# Patient Record
Sex: MALE | Race: BLACK OR AFRICAN AMERICAN | Employment: UNEMPLOYED | URBAN - NONMETROPOLITAN AREA
[De-identification: names, ages, dates, MRNs, and addresses within clinical notes are randomized per-mention and may not be internally consistent; named-entity substitution may affect disease eponyms.]

---

## 2020-11-26 ENCOUNTER — HOSPITAL ENCOUNTER (EMERGENCY)
Age: 60
Discharge: LEFT BEFORE DISCHARGE | End: 2020-11-26
Attending: FAMILY MEDICINE
Payer: COMMERCIAL

## 2020-11-26 DIAGNOSIS — F10.920 ALCOHOLIC INTOXICATION WITHOUT COMPLICATION (HCC): Primary | ICD-10-CM

## 2020-11-26 PROCEDURE — 99282 EMERGENCY DEPT VISIT SF MDM: CPT

## 2020-11-26 NOTE — ED PROVIDER NOTES
EMERGENCY DEPARTMENT HISTORY AND PHYSICAL EXAM      Date: 11/26/2020  Patient Name: Anisha Haq    History of Presenting Illness     Chief Complaint   Patient presents with    Alcohol intoxication       History Provided By: Patient    HPI: Anisha Haq, 64 y.o. male presents to the ED via EMS. He is somnolent and intoxicated, states he only called EMS for a ride home. He has no complaints and refuses evaluation. There are no other complaints, changes, or physical findings at this time. PCP: Shereen Thorne NP    No current facility-administered medications on file prior to encounter. Current Outpatient Medications on File Prior to Encounter   Medication Sig Dispense Refill    tiZANidine (ZANAFLEX) 4 mg capsule Take 4 mg by mouth three (3) times daily.  meloxicam (MOBIC) 15 mg tablet Take 15 mg by mouth daily.  traMADol (ULTRAM) 50 mg tablet Take 50 mg by mouth every six (6) hours as needed for Pain.  cholecalciferol (VITAMIN D3) (1000 Units /25 mcg) tablet Take  by mouth daily.  metoprolol portillo-hydrochlorothiaz 25-12.5 mg Tb24 Take  by mouth. Past History     Past Medical History:  History reviewed. No pertinent past medical history. Past Surgical History:  History reviewed. No pertinent surgical history. Family History:  History reviewed. No pertinent family history. Social History:  Social History     Tobacco Use    Smoking status: Current Every Day Smoker    Smokeless tobacco: Never Used   Substance Use Topics    Alcohol use: Yes    Drug use: Not on file       Allergies:  No Known Allergies      Review of Systems   Review of Systems   Unable to perform ROS: Other   Intoxicated. Physical Exam   Physical Exam  Nursing note reviewed. Constitutional:       General: He is not in acute distress. Appearance: Normal appearance. He is not ill-appearing, toxic-appearing or diaphoretic. Comments: Somnolent.    HENT:      Head: Normocephalic and atraumatic. Neck:      Musculoskeletal: Normal range of motion. Musculoskeletal: Normal range of motion. Skin:     General: Skin is dry. Neurological:      Mental Status: He is oriented to person, place, and time. Comments: Moves all four extremities. Psychiatric:         Behavior: Behavior normal.      Comments: Somnolent. Diagnostic Study Results     Labs -   No results found for this or any previous visit (from the past 12 hour(s)). Radiologic Studies -   No orders to display     CT Results  (Last 48 hours)    None        CXR Results  (Last 48 hours)    None            Medical Decision Making   I am the first provider for this patient. I reviewed the vital signs, available nursing notes, past medical history, past surgical history, family history and social history. Vital Signs-Reviewed the patient's vital signs. No data found. Records Reviewed: Nursing Notes        ED Course:   1756  Initial assessment performed. The patients presenting problems have been discussed, and they are in agreement with the care plan formulated and outlined with them. I have encouraged them to ask questions as they arise throughout their visit. Provider Notes (Medical Decision Making): Patient elopes prior to full evaluation. He has no acute complaints and is stable. PROCEDURES    Procedures    Consultations:     Consultations: - NONE    Disposition     Disposition: Eloped. Labs Reviewed - No data to display     No results found for this or any previous visit (from the past 12 hour(s)). No orders to display        CLINICAL IMPRESSION    No diagnosis found. Diagnosis     Clinical Impression: No diagnosis found. By signing my name below, Nayefelisha Epps, ED scribe, attest that this documentation has been prepared under the direction and in presence of Ja Bansal DO on 11/26/20.  Electronically signed: Sandra Enrique, 11/26/20, 6:37 PM

## 2020-11-26 NOTE — ED TRIAGE NOTES
Patient arrived ambulatory by [facility]. States he just wanted \"a ride back to [place] and my phone isn't working. \" Patient able to give his sisters number. Patient stating he is refusing to stay and be treated. Sister Weston Meier is willing to  patient.

## 2021-08-01 ENCOUNTER — APPOINTMENT (OUTPATIENT)
Dept: GENERAL RADIOLOGY | Age: 61
End: 2021-08-01
Attending: EMERGENCY MEDICINE
Payer: MEDICAID

## 2021-08-01 ENCOUNTER — HOSPITAL ENCOUNTER (EMERGENCY)
Age: 61
Discharge: HOME | End: 2021-08-02
Attending: EMERGENCY MEDICINE
Payer: MEDICAID

## 2021-08-01 DIAGNOSIS — F10.920 ALCOHOLIC INTOXICATION WITHOUT COMPLICATION (HCC): ICD-10-CM

## 2021-08-01 DIAGNOSIS — E87.6 HYPOKALEMIA: Primary | ICD-10-CM

## 2021-08-01 DIAGNOSIS — S90.111A CONTUSION OF RIGHT GREAT TOE WITHOUT DAMAGE TO NAIL, INITIAL ENCOUNTER: ICD-10-CM

## 2021-08-01 DIAGNOSIS — R07.9 CHEST PAIN, UNSPECIFIED TYPE: ICD-10-CM

## 2021-08-01 LAB
ANION GAP SERPL CALC-SCNC: 11 MMOL/L
BASOPHILS # BLD: 0.1 K/UL (ref 0–0.1)
BASOPHILS NFR BLD: 1 % (ref 0–2)
BUN SERPL-MCNC: 10 MG/DL (ref 9–21)
BUN/CREAT SERPL: 14
CA-I BLD-MCNC: 9 MG/DL (ref 8.5–10.5)
CHLORIDE SERPL-SCNC: 106 MMOL/L (ref 94–111)
CO2 SERPL-SCNC: 24 MMOL/L (ref 21–33)
CREAT SERPL-MCNC: 0.7 MG/DL (ref 0.8–1.5)
D DIMER PPP FEU-MCNC: 0.43 UG/ML(FEU)
EOSINOPHIL # BLD: 0.2 K/UL (ref 0–0.4)
EOSINOPHIL NFR BLD: 2 % (ref 0–5)
ERYTHROCYTE [DISTWIDTH] IN BLOOD BY AUTOMATED COUNT: 16.5 % (ref 11.6–14.5)
ETHANOL PERCENT, ALCP: 0.26 %
ETHANOL SERPL-MCNC: 259 MG/DL
GLUCOSE SERPL-MCNC: 87 MG/DL (ref 70–110)
HCT VFR BLD AUTO: 38.6 % (ref 36–48)
HGB BLD-MCNC: 12.9 G/DL (ref 13–16)
IMM GRANULOCYTES # BLD AUTO: 0 K/UL
IMM GRANULOCYTES NFR BLD AUTO: 0 %
LYMPHOCYTES # BLD: 3 K/UL (ref 0.9–3.6)
LYMPHOCYTES NFR BLD: 31 % (ref 21–52)
MCH RBC QN AUTO: 32.9 PG (ref 24–34)
MCHC RBC AUTO-ENTMCNC: 33.4 G/DL (ref 31–37)
MCV RBC AUTO: 98.5 FL (ref 74–97)
MONOCYTES # BLD: 1.1 K/UL (ref 0.05–1.2)
MONOCYTES NFR BLD: 12 % (ref 3–10)
NEUTS SEG # BLD: 5.4 K/UL (ref 1.8–8)
NEUTS SEG NFR BLD: 54 % (ref 40–73)
PLATELET # BLD AUTO: 661 K/UL (ref 135–420)
PMV BLD AUTO: 8.3 FL (ref 9.2–11.8)
POTASSIUM SERPL-SCNC: 3 MMOL/L (ref 3.2–5.1)
RBC # BLD AUTO: 3.92 M/UL (ref 4.7–5.5)
SODIUM SERPL-SCNC: 141 MMOL/L (ref 135–145)
TROPONIN I SERPL-MCNC: <0.02 NG/ML (ref 0.02–0.05)
TROPONIN I SERPL-MCNC: <0.02 NG/ML (ref 0.02–0.05)
WBC # BLD AUTO: 9.8 K/UL (ref 4.6–13.2)

## 2021-08-01 PROCEDURE — 96361 HYDRATE IV INFUSION ADD-ON: CPT

## 2021-08-01 PROCEDURE — 74011250636 HC RX REV CODE- 250/636: Performed by: EMERGENCY MEDICINE

## 2021-08-01 PROCEDURE — 71046 X-RAY EXAM CHEST 2 VIEWS: CPT

## 2021-08-01 PROCEDURE — 73630 X-RAY EXAM OF FOOT: CPT

## 2021-08-01 PROCEDURE — 36415 COLL VENOUS BLD VENIPUNCTURE: CPT

## 2021-08-01 PROCEDURE — 84484 ASSAY OF TROPONIN QUANT: CPT

## 2021-08-01 PROCEDURE — 85025 COMPLETE CBC W/AUTO DIFF WBC: CPT

## 2021-08-01 PROCEDURE — 80048 BASIC METABOLIC PNL TOTAL CA: CPT

## 2021-08-01 PROCEDURE — 96360 HYDRATION IV INFUSION INIT: CPT

## 2021-08-01 PROCEDURE — 99285 EMERGENCY DEPT VISIT HI MDM: CPT

## 2021-08-01 PROCEDURE — 85379 FIBRIN DEGRADATION QUANT: CPT

## 2021-08-01 PROCEDURE — 82077 ASSAY SPEC XCP UR&BREATH IA: CPT

## 2021-08-01 PROCEDURE — 93005 ELECTROCARDIOGRAM TRACING: CPT

## 2021-08-01 RX ORDER — SODIUM CHLORIDE 9 MG/ML
1000 INJECTION, SOLUTION INTRAVENOUS ONCE
Status: COMPLETED | OUTPATIENT
Start: 2021-08-01 | End: 2021-08-02

## 2021-08-01 RX ADMIN — SODIUM CHLORIDE 1000 ML: 9 INJECTION, SOLUTION INTRAVENOUS at 20:24
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
## 2021-08-02 VITALS
RESPIRATION RATE: 15 BRPM | HEIGHT: 72 IN | BODY MASS INDEX: 25.6 KG/M2 | WEIGHT: 189 LBS | OXYGEN SATURATION: 95 % | HEART RATE: 105 BPM | DIASTOLIC BLOOD PRESSURE: 85 MMHG | SYSTOLIC BLOOD PRESSURE: 151 MMHG | TEMPERATURE: 98.6 F

## 2021-08-02 LAB
ATRIAL RATE: 119 BPM
CALCULATED P AXIS, ECG09: 74 DEGREES
CALCULATED R AXIS, ECG10: 55 DEGREES
CALCULATED T AXIS, ECG11: 57 DEGREES
DIAGNOSIS, 93000: NORMAL
P-R INTERVAL, ECG05: 147 MS
Q-T INTERVAL, ECG07: 352 MS
QRS DURATION, ECG06: 89 MS
QTC CALCULATION (BEZET), ECG08: 496 MS
VENTRICULAR RATE, ECG03: 119 BPM

## 2021-08-02 PROCEDURE — 74011250637 HC RX REV CODE- 250/637: Performed by: EMERGENCY MEDICINE

## 2021-08-02 RX ORDER — POTASSIUM CHLORIDE 750 MG/1
40 TABLET, EXTENDED RELEASE ORAL
Status: COMPLETED | OUTPATIENT
Start: 2021-08-02 | End: 2021-08-02

## 2021-08-02 RX ADMIN — POTASSIUM CHLORIDE 40 MEQ: 10 TABLET, EXTENDED RELEASE ORAL at 00:21

## 2021-08-02 NOTE — PROGRESS NOTES
IV infiltrated during medication administration. IV D/C'd. Attempted to [address] without success. Charge Nurse agrees to attempt access. Physician aware.
In to draw 2nd troponin. Patient continues to deny chest pain. Updated on plan of care. Offered a snack. No further needs made known.
Patient ambulated in hallway without difficulty. MD made aware and suggested patient be discharged in about 30 minutes. Called patient's sister to make aware.
Ramy Taped toes together per orders. Advised patient to see podiatrist for nail care.
not applicable

## 2021-09-19 ENCOUNTER — HOSPITAL ENCOUNTER (EMERGENCY)
Age: 61
Discharge: HOME | End: 2021-09-19
Attending: EMERGENCY MEDICINE | Admitting: EMERGENCY MEDICINE
Payer: MEDICAID

## 2021-09-19 VITALS
OXYGEN SATURATION: 100 % | DIASTOLIC BLOOD PRESSURE: 75 MMHG | RESPIRATION RATE: 18 BRPM | TEMPERATURE: 97.8 F | HEART RATE: 102 BPM | HEIGHT: 72 IN | SYSTOLIC BLOOD PRESSURE: 127 MMHG | WEIGHT: 189 LBS | BODY MASS INDEX: 25.6 KG/M2
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
## 2021-09-19 DIAGNOSIS — S00.83XA CONTUSION OF FACE, INITIAL ENCOUNTER: ICD-10-CM

## 2021-09-19 DIAGNOSIS — S50.01XA CONTUSION OF RIGHT ELBOW, INITIAL ENCOUNTER: ICD-10-CM

## 2021-09-19 PROCEDURE — 74011000250 HC RX REV CODE- 250: Performed by: EMERGENCY MEDICINE

## 2021-09-19 PROCEDURE — 74011250637 HC RX REV CODE- 250/637: Performed by: EMERGENCY MEDICINE

## 2021-09-19 PROCEDURE — 99284 EMERGENCY DEPT VISIT MOD MDM: CPT

## 2021-09-19 RX ORDER — ACETAMINOPHEN 325 MG/1
650 TABLET ORAL
Status: COMPLETED | OUTPATIENT
Start: 2021-09-19 | End: 2021-09-19

## 2021-09-19 RX ORDER — MUPIROCIN 20 MG/G
OINTMENT TOPICAL DAILY
Status: DISCONTINUED | OUTPATIENT
Start: 2021-09-19 | End: 2021-09-19

## 2021-09-19 RX ORDER — ACETAMINOPHEN 500 MG
1 TABLET ORAL DAILY
COMMUNITY
Start: 2020-12-16

## 2021-09-19 RX ORDER — LANOLIN ALCOHOL/MO/W.PET/CERES
325 CREAM (GRAM) TOPICAL DAILY
COMMUNITY
Start: 2021-05-06

## 2021-09-19 RX ORDER — METOPROLOL TARTRATE 50 MG/1
50 TABLET ORAL 2 TIMES DAILY
COMMUNITY

## 2021-09-19 RX ORDER — LANOLIN ALCOHOL/MO/W.PET/CERES
1000 CREAM (GRAM) TOPICAL DAILY
COMMUNITY
Start: 2021-05-06

## 2021-09-19 RX ORDER — BACITRACIN 500 UNIT/G
1 PACKET (EA) TOPICAL
Status: COMPLETED | OUTPATIENT
Start: 2021-09-19 | End: 2021-09-19

## 2021-09-19 RX ORDER — MUPIROCIN 20 MG/G
OINTMENT TOPICAL
Status: DISCONTINUED | OUTPATIENT
Start: 2021-09-19 | End: 2021-09-19 | Stop reason: SDUPTHER

## 2021-09-19 RX ORDER — GUAIFENESIN 200 MG/1
200 TABLET ORAL
COMMUNITY

## 2021-09-19 RX ADMIN — BACITRACIN 1 PACKET: 500 OINTMENT TOPICAL at 03:33

## 2021-09-19 RX ADMIN — ACETAMINOPHEN 650 MG: 325 TABLET ORAL at 03:32

## 2021-09-19 NOTE — ED NOTES
Right elbow cleansed with wound spray bacitracin applied followed by telfa and 3\"jennifer. Sister has been called for ride.

## 2021-09-19 NOTE — ED NOTES
I have reviewed discharge instructions with the patient. The patient verbalized understanding.     Reviewed medication compliance, follow up with PCP, return to ER for any other new or worrisome concerns
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
## 2024-03-27 ENCOUNTER — HOSPITAL ENCOUNTER (OUTPATIENT)
Age: 64
Discharge: HOME | End: 2024-03-30

## 2024-03-27 LAB — SENTARA SPECIMEN COLLECTION: NORMAL

## 2024-03-27 PROCEDURE — 99001 SPECIMEN HANDLING PT-LAB: CPT
